# Patient Record
Sex: MALE | Race: WHITE
[De-identification: names, ages, dates, MRNs, and addresses within clinical notes are randomized per-mention and may not be internally consistent; named-entity substitution may affect disease eponyms.]

---

## 2018-12-04 ENCOUNTER — HOSPITAL ENCOUNTER (EMERGENCY)
Dept: HOSPITAL 59 - ER | Age: 33
LOS: 1 days | Discharge: HOME | End: 2018-12-05
Payer: COMMERCIAL

## 2018-12-04 DIAGNOSIS — M54.2: ICD-10-CM

## 2018-12-04 DIAGNOSIS — M25.512: ICD-10-CM

## 2018-12-04 DIAGNOSIS — M79.18: Primary | ICD-10-CM

## 2018-12-04 PROCEDURE — 99282 EMERGENCY DEPT VISIT SF MDM: CPT

## 2018-12-05 NOTE — EMERGENCY DEPARTMENT RECORD
History of Present Illness





- General


Chief complaint: Pain


Stated complaint: NECK/SHOULDER PAIN


Time Seen by Provider: 18 23:55


Source: Patient


Mode of Arrival: Ambulatory


Limitations: No limitations





- History of Present Illness


Initial comments: 





32 yo male presents to ED for evaluation of left shoulder and neck pain that 

began 2 days ago.  Patient reports waking up with pain to the affected area, 

but proceeded to go outdoors and cut wood throughout the day which improved his 

pain symptoms.  Patient reports the area was more painful the following morning 

(yesterday).  Patient denies numbness, tingling, or weakness to the left upper 

extremity, and denies chest pain or difficulty in breathing.  Patient denies 

health problems at his baseline.


MD Complaint: Neck Pain


Onset/Timin


-: Days(s)


Location: Left


History of Same: Yes


Radiation: Proximal


Severity scale (1-10): 4


Quality: Aching


Consistency: Constant


Improves with: Movement


Worsens with: Rest


Associated Symptoms: Denies other symptoms





- Related Data


 Previous Rx's











 Medication  Instructions  Recorded


 


Diazepam [Valium] 5 mg PO Q8H PRN #9 tab 18











 Allergies











Allergy/AdvReac Type Severity Reaction Status Date / Time


 


No Known Drug Allergies Allergy   Verified 18 23:56














Travel Screening





- Travel/Exposure Within Last 30 Days


Have you traveled within the last 30 days?: No





- Travel Symptoms


Symptom Screening: None





Review of Systems


Constitutional: Denies: Chills, Fever, Malaise, Night sweats


Eyes: Denies: Eye discharge, Eye pain


ENT: Denies: Congestion, Ear pain, Epistaxis


Respiratory: Denies: Cough, Dyspnea


Cardiovascular: Denies: Chest pain, Dyspnea on exertion


Endocrine: Denies: Fatigue, Heat or cold intolerance


Gastrointestinal: Denies: Abdominal pain, Nausea, Vomiting


Genitourinary: Denies: Incontinence, Retention


Musculoskeletal: Reports: Arthralgia.  Denies: Back pain, Gout, Joint swelling


Skin: Denies: Bruising, Change in color, Change in hair/nails


Neurological: Denies: Abnormal gait, Confusion, Headache, Seizure


Psychiatric: Denies: Anxiety


Hematological/Lymphatic: Denies: Anemia, Blood Clots





Physical Exam





- General


General Appearance: Alert, Oriented x3, Cooperative, Mild distress, Anxious


Limitations: No limitations





- Head


Head exam: Atraumatic, Normocephalic, Normal inspection


Head exam detail: negative: Abrasion, Contusion, Seth's sign, General 

tenderness, Hematoma, Laceration





- Eye


Eye exam: Normal appearance.  negative: Conjunctival injection, Periorbital 

swelling, Periorbital tenderness, Scleral icterus





- ENT


Ear exam: negative: Auricular hematoma, Auricular trauma


Nasal Exam: negative: Active bleeding, Discharge, Dried blood, Foreign body


Mouth exam: negative: Drooling, Laceration, Tongue elevation





- Neck


Neck exam: Normal inspection.  negative: Meningismus, Tenderness





- Respiratory


Respiratory exam: Normal lung sounds bilaterally.  negative: Rales, Respiratory 

distress, Rhonchi, Stridor





- Cardiovascular


Cardiovascular Exam: Regular rate, Normal rhythm, Normal heart sounds





- GI/Abdominal


GI/Abdominal exam: Soft.  negative: Rebound, Rigid, Tenderness





- Rectal


Rectal exam: Deferred





- 


 exam: Deferred





- Extremities


Extremities exam: Tenderness (TTP along the supraspinatus left, reproducuible 

on examination), Other ( strength 5/5, strong distal radial pulse).  

negative: Calf tenderness, Pedal edema





- Back


Back exam: Denies: CVA tenderness (R), CVA tenderness (L)





- Neurological


Neurological exam: Alert, Normal gait, Oriented X3





- Psychiatric


Psychiatric exam: Anxious, Normal mood





- Skin


Skin exam: Normal color.  negative: Abrasion


Type of lesion: negative: abrasion





Course





 Vital Signs











  18





  23:51


 


Temperature 97.6 F


 


Pulse Rate [ 79





Pulse Ox Probe] 


 


Respiratory 20





Rate 


 


Blood Pressure 150/125





[Right Arm] 


 


Pulse Ox 99














- Reevaluation(s)


Reevaluation #1: 





18 23:59


Examination appears reproducible with palpation over the left supra-spinatus 

muscle


History (improves with exertion) and physical examination (reproducible) are c/

w myofascial strain/pain.


Patient denies health problems at his baseline, has no cardiac risk factors.


Offered the patient EKG here in the ED, patient declined.


Patient appears stable for discharge at this time.








Disposition


Disposition: Discharge


Clinical Impression: 


 Myofascial pain





Disposition: Home, Self-Care


Condition: (2) Stable


Instructions:  Muscle Strain (ED)


Additional Instructions: 


Return to ED if your symptoms worsen or if you have any concerns.


Valium as directed.


Follow-up with your family doctor in 3-5 days as directed.


Prescriptions: 


Diazepam [Valium] 5 mg PO Q8H PRN #9 tab


 PRN Reason: Muscle Spasms


Forms:  Patient Portal Access


Time of Disposition: 00:02





Quality





- Quality Measures


Quality Measures: N/A





- Blood Pressure Screening


Does Patient Have Any of the Following: No


Blood Pressure Classification: Hypertensive Reading


Systolic Measurement: 145


Diastolic Measurement: 99


Screening for High Blood Pressure: < First Hypertensive BP, F/U Documented > [

]


First Hypertensive Follow-up Interventions: Referral to alternative/primary 

care provider.